# Patient Record
Sex: MALE | Race: WHITE | Employment: FULL TIME | URBAN - METROPOLITAN AREA
[De-identification: names, ages, dates, MRNs, and addresses within clinical notes are randomized per-mention and may not be internally consistent; named-entity substitution may affect disease eponyms.]

---

## 2019-03-29 ENCOUNTER — HOSPITAL ENCOUNTER (OUTPATIENT)
Age: 39
Setting detail: OUTPATIENT SURGERY
Discharge: HOME | End: 2019-03-29
Attending: INTERNAL MEDICINE | Admitting: INTERNAL MEDICINE
Payer: COMMERCIAL

## 2019-03-29 VITALS
SYSTOLIC BLOOD PRESSURE: 104 MMHG | HEART RATE: 76 BPM | DIASTOLIC BLOOD PRESSURE: 73 MMHG | HEIGHT: 68 IN | BODY MASS INDEX: 23.49 KG/M2 | RESPIRATION RATE: 16 BRPM | OXYGEN SATURATION: 98 % | WEIGHT: 155 LBS | TEMPERATURE: 96.7 F

## 2019-03-29 PROCEDURE — 3609012400 HC EGD TRANSORAL BIOPSY SINGLE/MULTIPLE: Performed by: INTERNAL MEDICINE

## 2019-03-29 PROCEDURE — 88305 TISSUE EXAM BY PATHOLOGIST: CPT

## 2019-03-29 PROCEDURE — 2709999900 HC NON-CHARGEABLE SUPPLY: Performed by: INTERNAL MEDICINE

## 2019-03-29 PROCEDURE — 6360000002 HC RX W HCPCS: Performed by: INTERNAL MEDICINE

## 2019-03-29 PROCEDURE — 6370000000 HC RX 637 (ALT 250 FOR IP): Performed by: INTERNAL MEDICINE

## 2019-03-29 PROCEDURE — 3609010300 HC COLONOSCOPY W/BIOPSY SINGLE/MULTIPLE: Performed by: INTERNAL MEDICINE

## 2019-03-29 PROCEDURE — 99152 MOD SED SAME PHYS/QHP 5/>YRS: CPT | Performed by: INTERNAL MEDICINE

## 2019-03-29 PROCEDURE — 7100000011 HC PHASE II RECOVERY - ADDTL 15 MIN: Performed by: INTERNAL MEDICINE

## 2019-03-29 PROCEDURE — 99153 MOD SED SAME PHYS/QHP EA: CPT | Performed by: INTERNAL MEDICINE

## 2019-03-29 PROCEDURE — 7100000010 HC PHASE II RECOVERY - FIRST 15 MIN: Performed by: INTERNAL MEDICINE

## 2019-03-29 PROCEDURE — 2500000003 HC RX 250 WO HCPCS: Performed by: INTERNAL MEDICINE

## 2019-03-29 RX ORDER — MEPERIDINE HYDROCHLORIDE 50 MG/ML
INJECTION INTRAMUSCULAR; INTRAVENOUS; SUBCUTANEOUS PRN
Status: DISCONTINUED | OUTPATIENT
Start: 2019-03-29 | End: 2019-03-29 | Stop reason: ALTCHOICE

## 2019-03-29 RX ORDER — MIDAZOLAM HYDROCHLORIDE 1 MG/ML
INJECTION INTRAMUSCULAR; INTRAVENOUS PRN
Status: DISCONTINUED | OUTPATIENT
Start: 2019-03-29 | End: 2019-03-29 | Stop reason: ALTCHOICE

## 2019-03-29 RX ORDER — PANTOPRAZOLE SODIUM 40 MG/1
40 TABLET, DELAYED RELEASE ORAL
Qty: 30 TABLET | Refills: 1 | Status: SHIPPED | OUTPATIENT
Start: 2019-03-29 | End: 2019-04-28

## 2019-03-29 ASSESSMENT — PAIN SCALES - GENERAL
PAINLEVEL_OUTOF10: 0

## 2019-03-29 ASSESSMENT — PAIN - FUNCTIONAL ASSESSMENT: PAIN_FUNCTIONAL_ASSESSMENT: 0-10

## 2019-03-29 NOTE — H&P
History and Physical / Pre-Sedation Assessment    Patient: Kevon Monte   :   1980     Intended Procedure:  egd and colonoscopy    HPI: diarrhea. Melena. Upper and lower abdominal pain. Fh of colon polyps and colon cancer    Past Medical History:   has a past medical history of Forearm fracture, Leg laceration, Pericarditis, Shoulder separation, and Thumb fracture. Past Surgical History:   has no past surgical history on file. Medications:  Prior to Admission medications    Not on File       Family History:  family history includes Cancer in his maternal grandmother; Diabetes in his maternal grandfather. Social History:   reports that he has been smoking. He has never used smokeless tobacco. He reports that he drinks alcohol. He reports that he does not use drugs. Allergies:  Patient has no known allergies. Nurses notes reviewed and agreed. Medications reviewed    Physical Exam:  Vital Signs: /72   Pulse 70   Temp 96.7 °F (35.9 °C) (Oral)   Resp 16   Ht 5' 8\" (1.727 m)   Wt 155 lb (70.3 kg)   SpO2 100%   BMI 23.57 kg/m²    Pulmonary:Normal  Cardiac:Normal  Abdomen:Normal    Pre-Procedure Assessment / Plan:  ASA 2 - Patient with mild systemic disease with no functional limitations  Mallampati Airway Assessment:  Mallampati Class I - (soft palate, fauces, uvula & anterior/posterior tonsillar pillars are visible)  Level of Sedation Plan: Moderate sedation  Post Procedure plan: Return to same level of care    I assessed the patient and find that the patient is in satisfactory condition to proceed with the planned procedure and sedation plan. I have explained the risk, benefits, and alternatives to the procedure. The patient understands and agrees to proceed.   Nato Whiteside  7:38 AM 3/29/2019

## 2019-03-29 NOTE — OP NOTE
[address]                [address]                                OPERATIVE REPORT    PATIENT NAME: Adry Farah                     :        1980  MED REC NO:   [id number]                          ROOM:  ACCOUNT NO:   [de-identified]                           ADMIT DATE: 2019  PROVIDER:     Hugo Casas MD    DATE OF PROCEDURE:  2019    HISTORY:  A 70-year-old gentleman with a strong family history of colon  cancer, colon polyps, and peptic ulcer disease, who presented with upper  and lower abdominal pain, recurrent diarrhea, and melena. EGD:  With the patient in the left lateral position and after sedation  with 50 mg of Demerol and 8 mg of Versed IV, the Olympus video endoscope  was introduced into the esophagus and advanced towards the GE junction. There is an area concerning for Cline's at the GE junction. Biopsies  were obtained. Stomach was carefully inspected and it was normal.   Biopsies were obtained for Helicobacter pylori. The duodenum revealed  two superficial duodenal ulcers with minor duodenitis. No other  abnormality. Procedure was terminated without complication. COLONOSCOPY:  The Olympus video colonoscope was then inserted into the  rectum and carefully advanced to the cecum and further into the ileum up  to 30 cm. The ileal mucosa was normal.  The colon was inspected  carefully upon withdrawal of the scope. There was no sign of cancer,  polyp, inflammatory bowel, or diverticulosis. Random biopsies were  obtained to rule out collagenous/lymphocytic/microscopic colitis. Scope  was then removed without complication. IMPRESSION:  1. Two superficial duodenal ulcers with minor duodenitis. 2.  Normal colonoscopy. 3.  Normal terminal ileum. PLAN:  We will await pathology finding and followup in the office. Thank you, Dr. Saint Nanas.         Mele Griffin MD    D: 2019 8:24:34 T: 03/29/2019 14:14:02     [email or web address]  Job#: [id number]     Doc#: [id number]    CC:   MD Edmund Hastings MD

## 2019-05-24 ENCOUNTER — HOSPITAL ENCOUNTER (OUTPATIENT)
Age: 39
Setting detail: OUTPATIENT SURGERY
Discharge: HOME | End: 2019-05-24
Attending: INTERNAL MEDICINE | Admitting: INTERNAL MEDICINE
Payer: COMMERCIAL

## 2019-05-24 VITALS
RESPIRATION RATE: 15 BRPM | TEMPERATURE: 98.6 F | HEART RATE: 60 BPM | SYSTOLIC BLOOD PRESSURE: 98 MMHG | BODY MASS INDEX: 23.49 KG/M2 | HEIGHT: 68 IN | WEIGHT: 155 LBS | DIASTOLIC BLOOD PRESSURE: 68 MMHG | OXYGEN SATURATION: 100 %

## 2019-05-24 PROCEDURE — 3609012400 HC EGD TRANSORAL BIOPSY SINGLE/MULTIPLE: Performed by: INTERNAL MEDICINE

## 2019-05-24 PROCEDURE — 88305 TISSUE EXAM BY PATHOLOGIST: CPT

## 2019-05-24 PROCEDURE — 6370000000 HC RX 637 (ALT 250 FOR IP): Performed by: INTERNAL MEDICINE

## 2019-05-24 PROCEDURE — 7100000011 HC PHASE II RECOVERY - ADDTL 15 MIN: Performed by: INTERNAL MEDICINE

## 2019-05-24 PROCEDURE — 7100000010 HC PHASE II RECOVERY - FIRST 15 MIN: Performed by: INTERNAL MEDICINE

## 2019-05-24 PROCEDURE — 6360000002 HC RX W HCPCS: Performed by: INTERNAL MEDICINE

## 2019-05-24 PROCEDURE — 2709999900 HC NON-CHARGEABLE SUPPLY: Performed by: INTERNAL MEDICINE

## 2019-05-24 PROCEDURE — 99152 MOD SED SAME PHYS/QHP 5/>YRS: CPT | Performed by: INTERNAL MEDICINE

## 2019-05-24 RX ORDER — OMEGA-3S/DHA/EPA/FISH OIL/D3 300MG-1000
400 CAPSULE ORAL DAILY
COMMUNITY

## 2019-05-24 RX ORDER — MIDAZOLAM HYDROCHLORIDE 1 MG/ML
INJECTION INTRAMUSCULAR; INTRAVENOUS PRN
Status: DISCONTINUED | OUTPATIENT
Start: 2019-05-24 | End: 2019-05-24 | Stop reason: ALTCHOICE

## 2019-05-24 RX ORDER — MEPERIDINE HYDROCHLORIDE 50 MG/ML
INJECTION INTRAMUSCULAR; INTRAVENOUS; SUBCUTANEOUS PRN
Status: DISCONTINUED | OUTPATIENT
Start: 2019-05-24 | End: 2019-05-24 | Stop reason: ALTCHOICE

## 2019-05-24 ASSESSMENT — PAIN SCALES - GENERAL
PAINLEVEL_OUTOF10: 0

## 2019-05-24 ASSESSMENT — PAIN - FUNCTIONAL ASSESSMENT
PAIN_FUNCTIONAL_ASSESSMENT: FACES
PAIN_FUNCTIONAL_ASSESSMENT: 0-10

## 2019-05-24 NOTE — H&P
History and Physical / Pre-Sedation Assessment    Patient: Ronnie Zurita   :   1980     Intended Procedure:  egd    HPI: FU duodenal ulcer to insure healing. H/o abdominal pain and melena     Past Medical History:   has a past medical history of Forearm fracture, Leg laceration, Pericarditis, Shoulder separation, and Thumb fracture. Past Surgical History:   has a past surgical history that includes Upper gastrointestinal endoscopy (N/A, 3/29/2019) and Colonoscopy (N/A, 3/29/2019). Medications:  Prior to Admission medications    Medication Sig Start Date End Date Taking? Authorizing Provider   vitamin D3 (CHOLECALCIFEROL) 400 units TABS tablet Take 400 Units by mouth daily   Yes Historical Provider, MD   pantoprazole (PROTONIX) 40 MG tablet Take 1 tablet by mouth every morning (before breakfast) 3/29/19 4/28/19  Nahun Mike MD       Family History:  family history includes Cancer in his maternal grandmother; Diabetes in his maternal grandfather. Social History:   reports that he quit smoking about 8 years ago. He has never used smokeless tobacco. He reports that he drinks alcohol. He reports that he does not use drugs. Allergies:  Patient has no known allergies. ROS:  twelve point system review was unremarkable except for above noted history. Nurses notes reviewed and agreed. Medications reviewed    Physical Exam:  Vital Signs: /79   Pulse 80   Temp 98.6 °F (37 °C) (Oral)   Resp 18   Ht 5' 8\" (1.727 m)   Wt 155 lb (70.3 kg)   SpO2 95%   BMI 23.57 kg/m²    Pulmonary:Normal  Cardiac:Normal  Abdomen:Normal    Pre-Procedure Assessment / Plan:  ASA 2 - Patient with mild systemic disease with no functional limitations  Mallampati Airway Assessment:  Mallampati Class I - (soft palate, fauces, uvula & anterior/posterior tonsillar pillars are visible)  Level of Sedation Plan: Moderate sedation  Post Procedure plan: Return to same level of care    I assessed the patient and find that the patient is in satisfactory condition to proceed with the planned procedure and sedation plan. I have explained the risk, benefits, and alternatives to the procedure. The patient understands and agrees to proceed.   Wyatt Ortiz  7:52 AM 5/24/2019

## 2023-11-15 RX ORDER — IBUPROFEN 400 MG/1
400 TABLET ORAL EVERY 6 HOURS PRN
COMMUNITY
Start: 2023-11-13 | End: 2023-11-18

## 2023-11-15 RX ORDER — FLUTICASONE PROPIONATE 50 MCG
SPRAY, SUSPENSION (ML) NASAL
COMMUNITY
Start: 2023-11-03

## 2023-11-15 RX ORDER — ACETAMINOPHEN 500 MG
500 TABLET ORAL EVERY 4 HOURS PRN
COMMUNITY
Start: 2023-11-13

## 2023-11-15 NOTE — PROGRESS NOTES
[facility] PRE-SURGICAL TESTING INSTRUCTIONS                      PRIOR TO PROCEDURE DATE:    1. PLEASE FOLLOW ANY INSTRUCTIONS GIVEN TO YOU PER YOUR SURGEON. 2. Arrange for someone to drive you home and be with you for the first 24 hours after discharge for your safety after your procedure for which you received sedation. Ensure it is someone we can share information with regarding your discharge. NOTE: At this time ONLY 2 ADULTS may accompany you   One person ENCOURAGED to stay at hospital entire time if outpatient surgery      3. You must contact your surgeon for instructions IF:  You are taking any blood thinners, aspirin, anti-inflammatory or vitamins. There is a change in your physical condition such as a cold, fever, rash, cuts, sores, or any other infection, especially near your surgical site. 4. Do not drink alcohol the day before or day of your procedure. Do not use any recreational marijuana at least 24 hours or street drugs (heroin, cocaine) at minimum 5 days prior to your procedure. 5. A Pre-Surgical History and Physical MUST be completed WITHIN 30 DAYS OR LESS prior to your procedure. by your Physician or an Urgent Care        THE DAY OF YOUR PROCEDURE:  1. Follow instructions for ARRIVAL TIME as DIRECTED BY YOUR SURGEON. 2. Enter the MAIN entrance from [address] and follow the signs to the free Parking [employer] or [employer] (offered free of charge 7 am-5pm). 3. Enter the Main Entrance of the hospital (do not enter from the lower level of the parking garage). Upon entrance, check in with the  at the surgical information desk on your LEFT. Bring your insurance card and photo ID to register      4. DO NOT EAT ANYTHING 8 hours prior to arrival for surgery. You may have up to 8 ounces of water 4 hours prior to your arrival for surgery.    NOTE: ALL Gastric, Bariatric & Bowel surgery patients - you MUST follow your surgeon's instructions regarding extreme drowsiness, changes in your vital signs or breathing patterns. Nausea, headache, muscle aches, or sore throat may also occur after anesthesia. Your nurse will help you manage these potential side effects. 2. For comfort and safety, arrange to have someone at home with you for the first 24 hours after discharge. 3. You and your family will be given written instructions about your diet, activity, dressing care, medications, and return visits. 4. Once at home, should issues with nausea, pain, or bleeding occur, or should you notice any signs of infection, you should call your surgeon. 5. Always clean your hands before and after caring for your wound. Do not let your family touch your surgery site without cleaning their hands. 6. Narcotic pain medications can cause significant constipation. You may want to add a stool softener to your postoperative medication schedule or speak to your surgeon on how best to manage this SIDE EFFECT. SPECIAL INSTRUCTIONS :  Patient verbalized understanding of pre-op instructions. His questions were answered. Thank you for allowing us to care for you. We strive to exceed your expectations in the delivery of care and service provided to you and your family. If you need to contact the [facility] staff for any reason, please call us at [phone]    Instructions reviewed with patient during preadmission testing phone interview. Cynthia Calvillo RN.11/15/2023 .11:10 AM      ADDITIONAL EDUCATIONAL INFORMATION REVIEWED PER PHONE WITH YOU AND/OR YOUR FAMILY:  Yes Antibacterial Soap:  Patient encouraged to shower with antibacterial soap for several days prior to procedure.

## 2023-11-16 ENCOUNTER — ANESTHESIA EVENT (OUTPATIENT)
Dept: OPERATING ROOM | Age: 43
End: 2023-11-16
Payer: COMMERCIAL

## 2023-11-17 ENCOUNTER — ANESTHESIA (OUTPATIENT)
Dept: OPERATING ROOM | Age: 43
End: 2023-11-17
Payer: COMMERCIAL

## 2023-11-17 ENCOUNTER — HOSPITAL ENCOUNTER (OUTPATIENT)
Age: 43
Setting detail: OUTPATIENT SURGERY
Discharge: HOME | End: 2023-11-17
Attending: ORTHOPAEDIC SURGERY | Admitting: ORTHOPAEDIC SURGERY
Payer: COMMERCIAL

## 2023-11-17 ENCOUNTER — APPOINTMENT (OUTPATIENT)
Dept: GENERAL RADIOLOGY | Age: 43
End: 2023-11-17
Attending: ORTHOPAEDIC SURGERY
Payer: COMMERCIAL

## 2023-11-17 VITALS
OXYGEN SATURATION: 96 % | TEMPERATURE: 97.6 F | HEART RATE: 70 BPM | HEIGHT: 68 IN | WEIGHT: 150.4 LBS | SYSTOLIC BLOOD PRESSURE: 107 MMHG | RESPIRATION RATE: 14 BRPM | DIASTOLIC BLOOD PRESSURE: 80 MMHG | BODY MASS INDEX: 22.79 KG/M2

## 2023-11-17 DIAGNOSIS — S52.571A OTHER CLOSED INTRA-ARTICULAR FRACTURE OF DISTAL END OF RIGHT RADIUS, INITIAL ENCOUNTER: Primary | ICD-10-CM

## 2023-11-17 PROCEDURE — 2580000003 HC RX 258: Performed by: ANESTHESIOLOGY

## 2023-11-17 PROCEDURE — 64415 NJX AA&/STRD BRCH PLXS IMG: CPT | Performed by: ANESTHESIOLOGY

## 2023-11-17 PROCEDURE — 7100000001 HC PACU RECOVERY - ADDTL 15 MIN: Performed by: ORTHOPAEDIC SURGERY

## 2023-11-17 PROCEDURE — 73070 X-RAY EXAM OF ELBOW: CPT

## 2023-11-17 PROCEDURE — A4217 STERILE WATER/SALINE, 500 ML: HCPCS | Performed by: ORTHOPAEDIC SURGERY

## 2023-11-17 PROCEDURE — 2709999900 HC NON-CHARGEABLE SUPPLY: Performed by: ORTHOPAEDIC SURGERY

## 2023-11-17 PROCEDURE — 6360000002 HC RX W HCPCS: Performed by: NURSE ANESTHETIST, CERTIFIED REGISTERED

## 2023-11-17 PROCEDURE — 3700000001 HC ADD 15 MINUTES (ANESTHESIA): Performed by: ORTHOPAEDIC SURGERY

## 2023-11-17 PROCEDURE — 7100000011 HC PHASE II RECOVERY - ADDTL 15 MIN: Performed by: ORTHOPAEDIC SURGERY

## 2023-11-17 PROCEDURE — 3600000004 HC SURGERY LEVEL 4 BASE: Performed by: ORTHOPAEDIC SURGERY

## 2023-11-17 PROCEDURE — 6360000002 HC RX W HCPCS: Performed by: ANESTHESIOLOGY

## 2023-11-17 PROCEDURE — 2580000003 HC RX 258: Performed by: ORTHOPAEDIC SURGERY

## 2023-11-17 PROCEDURE — 7100000000 HC PACU RECOVERY - FIRST 15 MIN: Performed by: ORTHOPAEDIC SURGERY

## 2023-11-17 PROCEDURE — 3700000000 HC ANESTHESIA ATTENDED CARE: Performed by: ORTHOPAEDIC SURGERY

## 2023-11-17 PROCEDURE — 2500000003 HC RX 250 WO HCPCS: Performed by: NURSE ANESTHETIST, CERTIFIED REGISTERED

## 2023-11-17 PROCEDURE — C1713 ANCHOR/SCREW BN/BN,TIS/BN: HCPCS | Performed by: ORTHOPAEDIC SURGERY

## 2023-11-17 PROCEDURE — 7100000010 HC PHASE II RECOVERY - FIRST 15 MIN: Performed by: ORTHOPAEDIC SURGERY

## 2023-11-17 PROCEDURE — 6360000002 HC RX W HCPCS: Performed by: ORTHOPAEDIC SURGERY

## 2023-11-17 PROCEDURE — 3600000014 HC SURGERY LEVEL 4 ADDTL 15MIN: Performed by: ORTHOPAEDIC SURGERY

## 2023-11-17 DEVICE — SCREW BNE L18MM DIA2.7MM DST RAD LOK FULL THRD SQ DRV HD LO: Type: IMPLANTABLE DEVICE | Site: ARM | Status: FUNCTIONAL

## 2023-11-17 DEVICE — PLATE BONE W24XL51MM 12 HOLE RIGHT DST VOLAR RDL WRIST STNDR: Type: IMPLANTABLE DEVICE | Site: ARM | Status: FUNCTIONAL

## 2023-11-17 DEVICE — SCREW BNE L16MM DIA2.7MM DST RAD LOK FULL THRD SQ DRV HD LO: Type: IMPLANTABLE DEVICE | Site: ARM | Status: FUNCTIONAL

## 2023-11-17 DEVICE — SCREW BNE L20MM DIA2.7MM DST VOLAR RAD MULTDIR FULL THRD SQ: Type: IMPLANTABLE DEVICE | Site: ARM | Status: FUNCTIONAL

## 2023-11-17 DEVICE — K WIRE FIX DIA1.6MM S STL FOR DST VOLAR PLATING SYS: Type: IMPLANTABLE DEVICE | Site: ARM | Status: FUNCTIONAL

## 2023-11-17 DEVICE — SCREW BNE L13MM DIA2.7MM DST RAD NONLOCKING FULL THRD SQ: Type: IMPLANTABLE DEVICE | Site: ARM | Status: FUNCTIONAL

## 2023-11-17 DEVICE — SCREW BNE L20MM DIA2.7MM DST RAD LOK FULL THRD SQ DRV HD LO: Type: IMPLANTABLE DEVICE | Site: ARM | Status: FUNCTIONAL

## 2023-11-17 DEVICE — SCREW BNE L14MM DIA2.7MM DST VOLAR RAD NONLOCKING FULL THRD: Type: IMPLANTABLE DEVICE | Site: ARM | Status: FUNCTIONAL

## 2023-11-17 RX ORDER — SODIUM CHLORIDE 0.9 % (FLUSH) 0.9 %
5-40 SYRINGE (ML) INJECTION PRN
Status: DISCONTINUED | OUTPATIENT
Start: 2023-11-17 | End: 2023-11-17 | Stop reason: HOSPADM

## 2023-11-17 RX ORDER — DIPHENHYDRAMINE HYDROCHLORIDE 50 MG/ML
12.5 INJECTION INTRAMUSCULAR; INTRAVENOUS
Status: DISCONTINUED | OUTPATIENT
Start: 2023-11-17 | End: 2023-11-17 | Stop reason: HOSPADM

## 2023-11-17 RX ORDER — SODIUM CHLORIDE 0.9 % (FLUSH) 0.9 %
5-40 SYRINGE (ML) INJECTION EVERY 12 HOURS SCHEDULED
Status: DISCONTINUED | OUTPATIENT
Start: 2023-11-17 | End: 2023-11-17 | Stop reason: HOSPADM

## 2023-11-17 RX ORDER — KETAMINE HCL IN NACL, ISO-OSM 20 MG/2 ML
SYRINGE (ML) INJECTION PRN
Status: DISCONTINUED | OUTPATIENT
Start: 2023-11-17 | End: 2023-11-17 | Stop reason: SDUPTHER

## 2023-11-17 RX ORDER — FENTANYL CITRATE 50 UG/ML
INJECTION, SOLUTION INTRAMUSCULAR; INTRAVENOUS PRN
Status: DISCONTINUED | OUTPATIENT
Start: 2023-11-17 | End: 2023-11-17 | Stop reason: SDUPTHER

## 2023-11-17 RX ORDER — SODIUM CHLORIDE 9 MG/ML
INJECTION, SOLUTION INTRAVENOUS PRN
Status: DISCONTINUED | OUTPATIENT
Start: 2023-11-17 | End: 2023-11-17 | Stop reason: HOSPADM

## 2023-11-17 RX ORDER — ROPIVACAINE HYDROCHLORIDE 5 MG/ML
30 INJECTION, SOLUTION EPIDURAL; INFILTRATION; PERINEURAL ONCE
Status: DISCONTINUED | OUTPATIENT
Start: 2023-11-17 | End: 2023-11-17 | Stop reason: HOSPADM

## 2023-11-17 RX ORDER — HYDRALAZINE HYDROCHLORIDE 20 MG/ML
10 INJECTION INTRAMUSCULAR; INTRAVENOUS
Status: DISCONTINUED | OUTPATIENT
Start: 2023-11-17 | End: 2023-11-17 | Stop reason: HOSPADM

## 2023-11-17 RX ORDER — LABETALOL HYDROCHLORIDE 5 MG/ML
10 INJECTION, SOLUTION INTRAVENOUS
Status: DISCONTINUED | OUTPATIENT
Start: 2023-11-17 | End: 2023-11-17 | Stop reason: HOSPADM

## 2023-11-17 RX ORDER — PROPOFOL 10 MG/ML
INJECTION, EMULSION INTRAVENOUS CONTINUOUS PRN
Status: DISCONTINUED | OUTPATIENT
Start: 2023-11-17 | End: 2023-11-17 | Stop reason: SDUPTHER

## 2023-11-17 RX ORDER — HYDROMORPHONE HYDROCHLORIDE 1 MG/ML
0.5 INJECTION, SOLUTION INTRAMUSCULAR; INTRAVENOUS; SUBCUTANEOUS EVERY 5 MIN PRN
Status: DISCONTINUED | OUTPATIENT
Start: 2023-11-17 | End: 2023-11-17 | Stop reason: HOSPADM

## 2023-11-17 RX ORDER — METOCLOPRAMIDE HYDROCHLORIDE 5 MG/ML
10 INJECTION INTRAMUSCULAR; INTRAVENOUS
Status: DISCONTINUED | OUTPATIENT
Start: 2023-11-17 | End: 2023-11-17 | Stop reason: HOSPADM

## 2023-11-17 RX ORDER — ROPIVACAINE HYDROCHLORIDE 5 MG/ML
INJECTION, SOLUTION EPIDURAL; INFILTRATION; PERINEURAL
Status: COMPLETED | OUTPATIENT
Start: 2023-11-17 | End: 2023-11-17

## 2023-11-17 RX ORDER — MIDAZOLAM HYDROCHLORIDE 1 MG/ML
INJECTION INTRAMUSCULAR; INTRAVENOUS PRN
Status: DISCONTINUED | OUTPATIENT
Start: 2023-11-17 | End: 2023-11-17 | Stop reason: SDUPTHER

## 2023-11-17 RX ORDER — FENTANYL CITRATE 50 UG/ML
100 INJECTION, SOLUTION INTRAMUSCULAR; INTRAVENOUS ONCE
Status: COMPLETED | OUTPATIENT
Start: 2023-11-17 | End: 2023-11-17

## 2023-11-17 RX ORDER — LIDOCAINE HYDROCHLORIDE 20 MG/ML
INJECTION, SOLUTION INTRAVENOUS PRN
Status: DISCONTINUED | OUTPATIENT
Start: 2023-11-17 | End: 2023-11-17 | Stop reason: SDUPTHER

## 2023-11-17 RX ORDER — ONDANSETRON 2 MG/ML
INJECTION INTRAMUSCULAR; INTRAVENOUS PRN
Status: DISCONTINUED | OUTPATIENT
Start: 2023-11-17 | End: 2023-11-17 | Stop reason: SDUPTHER

## 2023-11-17 RX ORDER — MEPERIDINE HYDROCHLORIDE 25 MG/ML
12.5 INJECTION INTRAMUSCULAR; INTRAVENOUS; SUBCUTANEOUS EVERY 5 MIN PRN
Status: DISCONTINUED | OUTPATIENT
Start: 2023-11-17 | End: 2023-11-17 | Stop reason: HOSPADM

## 2023-11-17 RX ORDER — MIDAZOLAM HYDROCHLORIDE 1 MG/ML
2 INJECTION INTRAMUSCULAR; INTRAVENOUS ONCE
Status: COMPLETED | OUTPATIENT
Start: 2023-11-17 | End: 2023-11-17

## 2023-11-17 RX ORDER — PROPOFOL 10 MG/ML
INJECTION, EMULSION INTRAVENOUS PRN
Status: DISCONTINUED | OUTPATIENT
Start: 2023-11-17 | End: 2023-11-17 | Stop reason: SDUPTHER

## 2023-11-17 RX ORDER — HYDROCODONE BITARTRATE AND ACETAMINOPHEN 5; 325 MG/1; MG/1
1 TABLET ORAL EVERY 6 HOURS PRN
Qty: 20 TABLET | Refills: 0 | Status: SHIPPED | OUTPATIENT
Start: 2023-11-17 | End: 2023-11-22

## 2023-11-17 RX ORDER — LIDOCAINE HYDROCHLORIDE 10 MG/ML
1 INJECTION, SOLUTION EPIDURAL; INFILTRATION; INTRACAUDAL; PERINEURAL
Status: DISCONTINUED | OUTPATIENT
Start: 2023-11-17 | End: 2023-11-17 | Stop reason: HOSPADM

## 2023-11-17 RX ORDER — DEXMEDETOMIDINE HYDROCHLORIDE 100 UG/ML
INJECTION, SOLUTION INTRAVENOUS PRN
Status: DISCONTINUED | OUTPATIENT
Start: 2023-11-17 | End: 2023-11-17 | Stop reason: SDUPTHER

## 2023-11-17 RX ORDER — HYDROMORPHONE HYDROCHLORIDE 1 MG/ML
0.5 INJECTION, SOLUTION INTRAMUSCULAR; INTRAVENOUS; SUBCUTANEOUS EVERY 10 MIN PRN
Status: DISCONTINUED | OUTPATIENT
Start: 2023-11-17 | End: 2023-11-17 | Stop reason: HOSPADM

## 2023-11-17 RX ORDER — MAGNESIUM HYDROXIDE 1200 MG/15ML
LIQUID ORAL CONTINUOUS PRN
Status: DISCONTINUED | OUTPATIENT
Start: 2023-11-17 | End: 2023-11-17 | Stop reason: HOSPADM

## 2023-11-17 RX ORDER — SODIUM CHLORIDE, SODIUM LACTATE, POTASSIUM CHLORIDE, CALCIUM CHLORIDE 600; 310; 30; 20 MG/100ML; MG/100ML; MG/100ML; MG/100ML
INJECTION, SOLUTION INTRAVENOUS CONTINUOUS
Status: DISCONTINUED | OUTPATIENT
Start: 2023-11-17 | End: 2023-11-17 | Stop reason: HOSPADM

## 2023-11-17 RX ADMIN — SODIUM CHLORIDE, POTASSIUM CHLORIDE, SODIUM LACTATE AND CALCIUM CHLORIDE: 600; 310; 30; 20 INJECTION, SOLUTION INTRAVENOUS at 06:31

## 2023-11-17 RX ADMIN — PROPOFOL 30 MG: 10 INJECTION, EMULSION INTRAVENOUS at 07:46

## 2023-11-17 RX ADMIN — MIDAZOLAM HYDROCHLORIDE 2 MG: 2 INJECTION, SOLUTION INTRAMUSCULAR; INTRAVENOUS at 07:02

## 2023-11-17 RX ADMIN — PROPOFOL 50 MG: 10 INJECTION, EMULSION INTRAVENOUS at 07:45

## 2023-11-17 RX ADMIN — LIDOCAINE HYDROCHLORIDE 60 MG: 20 INJECTION, SOLUTION INTRAVENOUS at 07:45

## 2023-11-17 RX ADMIN — ROPIVACAINE HYDROCHLORIDE 30 ML: 5 INJECTION, SOLUTION EPIDURAL; INFILTRATION; PERINEURAL at 07:07

## 2023-11-17 RX ADMIN — FENTANYL CITRATE 25 MCG: 50 INJECTION, SOLUTION INTRAMUSCULAR; INTRAVENOUS at 07:45

## 2023-11-17 RX ADMIN — MIDAZOLAM HYDROCHLORIDE 2 MG: 2 INJECTION, SOLUTION INTRAMUSCULAR; INTRAVENOUS at 07:36

## 2023-11-17 RX ADMIN — SODIUM CHLORIDE 2000 MG: 900 INJECTION INTRAVENOUS at 07:51

## 2023-11-17 RX ADMIN — PROPOFOL 150 MCG/KG/MIN: 10 INJECTION, EMULSION INTRAVENOUS at 07:45

## 2023-11-17 RX ADMIN — FENTANYL CITRATE 25 MCG: 50 INJECTION, SOLUTION INTRAMUSCULAR; INTRAVENOUS at 07:47

## 2023-11-17 RX ADMIN — FENTANYL CITRATE 100 MCG: 50 INJECTION, SOLUTION INTRAMUSCULAR; INTRAVENOUS at 07:02

## 2023-11-17 RX ADMIN — ONDANSETRON 4 MG: 2 INJECTION INTRAMUSCULAR; INTRAVENOUS at 07:45

## 2023-11-17 RX ADMIN — Medication 10 MG: at 07:49

## 2023-11-17 RX ADMIN — DEXMEDETOMIDINE HYDROCHLORIDE 4 MCG: 100 INJECTION, SOLUTION INTRAVENOUS at 07:47

## 2023-11-17 ASSESSMENT — PAIN SCALES - GENERAL
PAINLEVEL_OUTOF10: 0

## 2023-11-17 NOTE — OP NOTE
[facility] Orthopaedics and Sports Medicine  Operative Report  [address] [facility] (1980)    Date of Surgery- 11/17/2023    Preoperative Diagnosis-   1.  right Distal Radius Fracture, intra-articular two parts                                           Postoperative Diagnosis-  1.  right Distal Radius Fracture intra-articular two parts    Procedure-     1. Open reduction and internal fixation right      distal radius fracture, intra-articular  two parts              2.  Flouroscopy right wrist three views      Surgeon-  Dawn Loya MD MD    Assistant- [address] [facility]      Anesthesia- Regional block / MAC      Estimated blood loss: 5ml     Implants- Biomet DVR crosslock distal radius plate with associated locking and nonlocking  screws    Antibiotics- See Anesthesia Note    DVT prophylaxis- SCDs bilateral     Complications- none immediate apparent     The patient was brought to the operating and room and placed in the supine position. After the induction of anesthesia a standard time-out was performed. The risks and benefits of surgical fixation versus non-operative management were discussed thoroughly. These included, but were not limited to infection, tendon or nerve injury, stiffness or pain of the wrist joint long-term, malunion, nonunion, implant failure, tendon rupture, scar sensitivity, adverse effects of anesthesia (stroke or death), and possible need for hardware removal.       Description of the Procedure: The right upper extremity had been prepped and draped in normal sterile fashion. Following the final timeout and ensuring antibiotic and DVT prophylaxis, the right upper extremity was exsanguinated and the tourniquet was inflated to 250 mmHg. A standard volar radial incision was made over the FCR, through skin and subcutaneous tissue, protecting the nearby artery and sensory nerve. The FCR sheath was opened and the tendon was temporarily moved.   Dissection was

## 2023-11-17 NOTE — ANESTHESIA POSTPROCEDURE EVALUATION
Department of Anesthesiology  Postprocedure Note    Patient: Jordana Mane  MRN: [id number]  YOB: 1980  Date of evaluation: 11/17/2023      Procedure Summary       Date: 11/17/23 Room / Location: [address] [address] [facility]    Anesthesia Start: 1571 Anesthesia Stop: 5757    Procedure: OPERATIVE FIXATION RIGHT DISTAL RADIUS FRACTURE INCLUDING OPEN REDUCTION, INTERNAL FIXATION (Right: Arm Lower) Diagnosis:       Closed fracture of distal end of right radius, unspecified fracture morphology, initial encounter      (Closed fracture of distal end of right radius, unspecified fracture morphology, initial encounter [S52.501A])    Surgeons: Christy Quick MD Responsible Provider: Lianne Cisneros MD    Anesthesia Type: general ASA Status: 2            Anesthesia Type: No value filed.     Deysi Phase I: Deysi Score: 10    Deysi Phase II: Deysi Score: 10      Anesthesia Post Evaluation    Patient location during evaluation: PACU  Patient participation: complete - patient participated  Level of consciousness: awake and alert  Pain score: 0  Airway patency: patent  Nausea & Vomiting: no nausea and no vomiting  Complications: no  Cardiovascular status: hemodynamically stable  Respiratory status: acceptable  Hydration status: euvolemic  Pain management: adequate

## 2023-11-17 NOTE — BRIEF OP NOTE
Brief Postoperative Note      Patient: Ulises Sewell  YOB: 1980  MRN: [id number]    Date of Procedure: 11/17/2023    Pre-Op Diagnosis Codes:     * Closed fracture of distal end of right radius, unspecified fracture morphology, initial encounter [S52.501A]    Post-Op Diagnosis: Same       Procedure(s):  OPERATIVE FIXATION RIGHT DISTAL RADIUS FRACTURE INCLUDING OPEN REDUCTION, INTERNAL FIXATION intra-articular fracture two parts  2.  Interpretation of fluoroscopy 3 views right wrist    Surgeon(s):  Anahy Guevara MD    Assistant:  Surgical Assistant: Parth Rodríguez    Anesthesia: Monitor Anesthesia Care, regional     Estimated Blood Loss (mL): 5ml     Complications: None immediate apparent     Specimens:   none    Implants:  Biomet DVR crosslock distal radius plate with associated locking and nonlocking screws      Drains: none    Findings: see fully dictated operative report       Electronically signed by Floyd Haines MD on 11/17/2023 at 9:17 AM

## 2023-11-17 NOTE — PROGRESS NOTES
Patient arrived to PACU post OPERATIVE FIXATION RIGHT DISTAL RADIUS FRACTURE INCLUDING OPEN REDUCTION, INTERNAL FIXATION - Right with Dr. Radha Meza. VSS on arrival. CRNA gave PACU RN report at bedside stating no complications during procedure. Dressing to surgical site clean, dry and intact. Patient shows no signs of pain at this time. Will continue to monitor.

## 2023-11-17 NOTE — PROGRESS NOTES
Pt received right axillary nerve block. Tolerated well. Pt resting in bed. Mom at bedside. Denies needs.

## 2023-11-17 NOTE — DISCHARGE INSTRUCTIONS
[facility]   Post op Instructions for Hand and Upper Extremity Surgery    * Keep dressing dry and clean. It is ok to Shower just keep Dressing dry. * Elevate operative arm. * Ice 20 minutes on 20 minutes off. * Follow-up appointment as scheduled by office staff. Check paperwork from office. If no appointment scheduled call the office. * If dressing is too tight, you may loosen Ace bandage and leave splint in place. * Sling, as needed  * If you have any questions, refer to the office number listed below. [phone]  ([phone] [facility])     PERIPHERAL NERVE BLOCK INSTRUCTIONS     Please remember while having a nerve block you are at an increased risk for [address] were given a nerve block today from the anesthesiologist. Most nerve blocks last anywhere from 6-36 hours. You should start taking your pain medication before the block wears off or when you first begin feeling discomfort. It takes at least 30-60 minutes for a pain pill to take effect. Pain medications should be taken with food. Consider setting an alarm through the night to help manage your pain level so you do not wake up with too much pain. Pain medicines can cause more sedation and decrease your breathing so ONLY take as directed. If you have Sleep Apnea, you need to use your C-Pap machine. What to expect after a nerve block:    Numbness, tingling- affected area feels heavy or asleep   Weakness or inability to move or control your affected area   Inability to feel temperature changes to your affected area  Usually the weakness wears off first, followed by the tingling or heaviness. You may notice more pain at this point and should start taking your pain meds.    If you had a shoulder block, you may experience:   Mild shortness of breath (may be relieved by sitting up in a chair or recliner)   Hoarse voice   Blurry vision   Unequal pupils   Drooping of your face (eye or lip) on the same side as the nerve block   Swelling at the injection site on the side of your neck  These side effects should resolve as the block wears off   IF you have severe or prolonged shortness of breath- GO to the nearest Emergency Room  If you had a nerve block of your arm or leg:   Protect the arm or leg from extreme hot or cold temperatures   Protect the arm or leg from obstructing blood flow by frequent position changes- Make sure fingers/ toes stay pink and warm. Call surgeon with any changes   For leg block, get assistance walking until the block wears off, i.e.:  crutches, walker, support person    If you continue to feel the effects of the nerve block for longer than 72 hours- call [facility] at [phone] and ask to speak with the Anesthesiologist on call. [facility]    There are potential side effects of anesthesia or sedation you may experience for the first 24 hours. These side effects include:    Confusion or Memory loss, Dizziness, or Delayed Reaction Times   [x]A responsible person should be with you for the next 24 hours. Do not operate any vehicles (automobiles, bicycles, motorcycles) or power tools or machinery for 24 hours. Do not sign any legal documents or make any legal decisions for 24 hours. Do not drink alcohol for 24 hours or while taking narcotic pain medication. Nausea    [x]Start with light diet and progress to your normal diet as you feel like eating. However, if you experience nausea or repeated episodes of vomiting which persist beyond 12-24 hours, notify your physician. Once nausea has passed, remember to keep drinking fluids. Difficulty Passing Urine  [x]Drink extra amounts of fluid today. Notify your physician if you have not urinated within 8 hours after your procedure or you feel uncomfortable. Irritated Throat from a Breathing Tube  [x]Drink extra amounts of fluid today.   Lozenges may

## 2023-11-17 NOTE — H&P
I have reviewed the history and physical and examined the patient and find no relevant changes. I have reviewed with the patient and/or family the risks, benefits, and alternatives to the procedure.     Jade Salinas MD  11/17/2023

## 2023-11-17 NOTE — ANESTHESIA PROCEDURE NOTES
normal and there were no abnormal pathologically findings seen. Ultrasound-Guided Right Axillary Block Note    Indication: Postoperative analgesia upon request of the attending surgeon. Procedure: Patient supine. Right arm abducted ninety degrees and externally rotated. Forearm flexed ninety degrees. Axillary artery identified using ultrasound and a 22G 80mm insulated regional block needle was advanced into the axillary sheath under direct ultrasound visualization. Ropivacaine 0.5% was injected under ultrasound visualization with good spread noted throughout the sheath-30cc total volume injected. The patient tolerated the procedure well and there were no apparent complications at the time of the procedure. Medications Administered  ropivacaine (NAROPIN) injection 0.5% - Perineural   30 mL - 11/17/2023 7:07:00 AM        Juno Lagos.  Kolby Puentes MD  November 17, 2023 8:40 AM

## (undated) DEVICE — BANDAGE,GAUZE,CONFORMING,3"X75",STRL,LF: Brand: MEDLINE

## (undated) DEVICE — BANDAGE COBAN 4 IN COMPR W4INXL5YD FOAM COHESIVE QUIK STK SELF ADH SFT

## (undated) DEVICE — SUTURE ABSORBABLE MONOFILAMENT 4-0 PS2 18 IN UD PDS + PDP496G

## (undated) DEVICE — SINGLE USE DEVICE INTENDED TO COVER EXPOSED ENDS OF ORTHOPEDIC PIN AND K-WIRES TO HELP PROTECT THE EXPOSED WIRE FROM SNAGGING ON CLOTHING.: Brand: OXBORO™ PIN COVER

## (undated) DEVICE — BANDAGE COMPR W1INXL5YD BGE E ADH TENSOPLAST

## (undated) DEVICE — TOWEL,STOP FLAG GOLD N-W: Brand: MEDLINE

## (undated) DEVICE — SUTURE VCRL + SZ 4-0 L27IN ABSRB UD L26MM SH 1/2 CIR VCP415H

## (undated) DEVICE — BANDAGE,ELASTIC,ESMARK,STERILE,4"X9',LF: Brand: MEDLINE

## (undated) DEVICE — UNDERGLOVE SURG SZ 8 BLU LTX FREE SYN POLYISOPRENE POLYMER

## (undated) DEVICE — ELECTRODE PT RET AD L9FT HI MOIST COND ADH HYDRGEL CORDED

## (undated) DEVICE — GARMENT,MEDLINE,DVT,INT,CALF,MED, GEN2: Brand: MEDLINE

## (undated) DEVICE — SUTURE ETHLN SZ 4-0 L18IN NONABSORBABLE BLK L19MM PS-2 3/8 1667H

## (undated) DEVICE — FORCEPS BX L240CM DIA2.4MM L NDL RAD JAW 4 133340

## (undated) DEVICE — HAND: Brand: MEDLINE INDUSTRIES, INC.

## (undated) DEVICE — TRAY,IRRIGATION,BULBSYRINGE,60ML,CSR,PVP: Brand: MEDLINE

## (undated) DEVICE — GLOVE ORTHO 7 1/2   MSG9475

## (undated) DEVICE — SOLUTION IV 1000ML 0.9% SOD CHL

## (undated) DEVICE — COVER LT HNDL BLU PLAS

## (undated) DEVICE — SUTURE VCRL SZ 4-0 L18IN ABSRB UD L19MM PS-2 3/8 CIR PRIM J496H

## (undated) DEVICE — PADDING CAST W4INXL4YD HIGHLY ABSRB THAN COT EZ APPL

## (undated) DEVICE — GOWN,SIRUS,POLYRNF,BRTHSLV,XLN/XL,20/CS: Brand: MEDLINE

## (undated) DEVICE — COVER,TABLE,HEAVY DUTY,77"X90",STRL: Brand: MEDLINE

## (undated) DEVICE — SYRINGE IRRIG 60ML SFT PLIABLE BLB EZ TO GRP 1 HND USE W/

## (undated) DEVICE — COTTON UNDERCAST PADDING,CRIMPED FINISH: Brand: WEBRIL

## (undated) DEVICE — BIT DRL DIA2.2MM CROSSLOCK MOD TY DVR ANAT VOLAR PLATING